# Patient Record
(demographics unavailable — no encounter records)

---

## 2025-02-11 NOTE — HISTORY OF PRESENT ILLNESS
[de-identified] : Fever, cough, headache [FreeTextEntry6] : Antoine had a fever of 102 F, muscle aches,  and nasal congestion. He complains of a sore throat. The rapid strep test was negative at the [facility]. He had Influenza A last week. He was seen at urgent care today. All tests were negative. This afternoon, he felt worse; he had a headache and was coughing. The cough occurs during the day and sometimes at night. The nasal discharge is thick and green.

## 2025-02-11 NOTE — HISTORY OF PRESENT ILLNESS
[de-identified] : Fever, cough, headache [FreeTextEntry6] : Antoine had a fever of 102 F, muscle aches,  and nasal congestion. He complains of a sore throat. The rapid strep test was negative at the [facility]. He had Influenza A last week. He was seen at urgent care today. All tests were negative. This afternoon, he felt worse; he had a headache and was coughing. The cough occurs during the day and sometimes at night. The nasal discharge is thick and green.

## 2025-02-11 NOTE — DISCUSSION/SUMMARY
[FreeTextEntry1] :  Advised to clean the nasal passages frequently. Discussed the antibiotic and the side effects. D/C if he has an allergic reaction or diarrhea. Call if the symptoms do not improve.

## 2025-05-05 NOTE — PHYSICAL EXAM
[Acute Distress] : no acute distress [Laceration] : no laceration [Ecchymosis] : no ecchymosis [Swelling] : no swelling [Traumatic] : atraumatic [Conjuctival Injection] : no conjunctival injection [Increased Tearing] : no increased tearing [Cerumen in canal] : no cerumen in canal [Discharge in canal] : no discharge in canal [Pain with manipulation of pinna] : no pain with manipulation of pinna [Erythema of canal] : no erythema of canal [Erythematous Oropharynx] : nonerythematous oropharynx [Enlarged Tonsils] : tonsils not enlarged [Wheezing] : no wheezing [Rales] : no rales [Tachypnea] : no tachypnea [Rhonchi] : no rhonchi [Murmur] : no murmur [FreeTextEntry2] : Pain on palpating the maxillary sinuses [de-identified] : Small, left submandibular  [de-identified] : Pain when palpating the anterior thighs

## 2025-05-05 NOTE — HISTORY OF PRESENT ILLNESS
[de-identified] : Nasal congestion, fever, leg pain  [FreeTextEntry6] : Antoine is congested and has facial pain. He has a history of multiple sinus infections and is followed up by ENT. The nasal discharge is thick and yellow-green. He also complains of pain in his thighs. The pain occurs at rest, lasts for hours, and improves on its own or with acetaminophen.  A rheumatologist and an immunologist have seen him.

## 2025-05-05 NOTE — HISTORY OF PRESENT ILLNESS
[de-identified] : Nasal congestion, fever, leg pain  [FreeTextEntry6] : Antoine is congested and has facial pain. He has a history of multiple sinus infections and is followed up by ENT. The nasal discharge is thick and yellow-green. He also complains of pain in his thighs. The pain occurs at rest, lasts for hours, and improves on its own or with acetaminophen.  A rheumatologist and an immunologist have seen him.

## 2025-05-05 NOTE — PHYSICAL EXAM
[Acute Distress] : no acute distress [Laceration] : no laceration [Ecchymosis] : no ecchymosis [Swelling] : no swelling [Traumatic] : atraumatic [Conjuctival Injection] : no conjunctival injection [Increased Tearing] : no increased tearing [Cerumen in canal] : no cerumen in canal [Discharge in canal] : no discharge in canal [Pain with manipulation of pinna] : no pain with manipulation of pinna [Erythema of canal] : no erythema of canal [Erythematous Oropharynx] : nonerythematous oropharynx [Enlarged Tonsils] : tonsils not enlarged [Wheezing] : no wheezing [Rales] : no rales [Tachypnea] : no tachypnea [Rhonchi] : no rhonchi [Murmur] : no murmur [FreeTextEntry2] : Pain on palpating the maxillary sinuses [de-identified] : Small, left submandibular  [de-identified] : Pain when palpating the anterior thighs

## 2025-05-05 NOTE — DISCUSSION/SUMMARY
[FreeTextEntry1] : Discussed the antibiotic and side effects. D/C if he has an allergic reaction, abdominal pain, or diarrhea. Clean the nasal passages. Will have more lab tests and an X-ray of the femur. The results will be discussed as soon as they become available.